# Patient Record
Sex: MALE | Race: WHITE | NOT HISPANIC OR LATINO | URBAN - METROPOLITAN AREA
[De-identification: names, ages, dates, MRNs, and addresses within clinical notes are randomized per-mention and may not be internally consistent; named-entity substitution may affect disease eponyms.]

---
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
## 2022-02-15 NOTE — PROGRESS NOTES
Patient:   NURY AGUILA            MRN: [id number]            FIN: [id number]               Age:   21 years     Sex:  Male     :  2000   Associated Diagnoses:   Close exposure to 2019-nCoV; Cough   Author:   Buddy Ramirez PA-C      Visit Information      Date of Service: 10/25/2021 11:38 am  Performing Location: [facility]  Encounter#: [id number]      Primary Care Provider (PCP):  TAM BELTRAN      Referring Provider:  Buddy Ramirez PA-C    NPI# [id number]   Visit type:  Video Visit via docplanner or [employer].    Participants in room during visit:  2   Location of patient:  Home  Location of provider:  _ (Clinic office )  Video Start Time:  1207  Video End Time:   1215    Today's visit was conducted via video conference due to the COVID-19 pandemic.  The patient's consent to proceed with a video visit has been obtained and documented. He has been vaccinated for Covid. No known Covid exposure. Would like to be tested. States no chronic illnesses.       Chief Complaint   URI symptoms      History of Present Illness   Patient is a 21 year old  who is being evaluated via a billable video visit.      Review of Systems   Constitutional:  Fever, Chills.    Eye:  Negative.    Ear/Nose/Mouth/Throat:  Nasal congestion, Sore throat.    Respiratory:  Cough.    Cardiovascular:  Negative.    Gastrointestinal:  Negative.    Genitourinary:  Negative.    Immunologic:  Negative.    Musculoskeletal:  Negative.    Integumentary:  Negative.    Neurologic:  Headache.    Psychiatric:  Negative.       Health Status   Allergies:    Allergic Reactions (Selected)  Severity Not Documented  No known allergies (No reactions were documented)  No Known Medication Allergies   Medications:  (Selected)      Problem list:    No problem items selected or recorded.      Histories   Past Medical History:    No active or resolved past medical history items have been selected or recorded.   Family History:    No  family history items have been selected or recorded.   Procedure history:    No active procedure history items have been selected or recorded.   Social History:        Electronic Cigarette/Vaping Assessment            Electronic Cigarette Use: Never.      Tobacco Assessment            Never (less than 100 in lifetime)        Physical Examination   General:  Alert and oriented, No acute distress.    Eye:  Pupils are equal, round and reactive to light, Normal conjunctiva.    HENT:  Oral mucosa is moist.    Neck:  Supple.    Respiratory:  Respirations are non-labored.    Psychiatric:  Cooperative, Appropriate mood & affect, Normal judgment.       Impression and Plan   Diagnosis     Close exposure to 2019-nCoV ([id number]-ZZ Z20.822).     Cough ([id number]-ZH R05.9).     Plan:  Patient should remain isolated until results of test return and given that tests are not 100% accurate, would be safest to assume that they are contagious with COVID-19 until their symptoms have fully resolved. Isolation is recommended for at least 7 days from the onset of symptoms and for 3 days after resolution of fevers and productive cough. This means patient should not go to work or any public areas. In addition, it is recommended at home that they separate themselves from other people and from animals as much as possible, including using a separate bathroom. If they do need to be around others, a facemask is recommended. Frequent hand hygiene and cleaning of high touch surfaces is also recommended.   Symptoms can last for several weeks. For patients with COVID-19, they can sometimes start to improve and then get worse again. If symptoms worsen at any time, including significant shortness of breath, low oxygen levels, high fevers that cannot be controlled, or concerns for dehydration, they should seek medical care. If going to the ER, calling 911, or seeking care at the clinic, they are reminded to notify staff that they have been tested for  COVID-19.  Patient also is informed that testing will be done in their car at a scheduled time. Test will be sent to an outside commercial lab and billed by that lab. [facility] cannot confirm to patient how billing will be handled by their insurance company.    Patient is also informed that testing for COVID-19 must be reported to the public health department along with contact information for the patient.  .    Patient Instructions:       Counseled: Patient, Activity, Verbalized understanding.    Orders     Orders (Selected)   Outpatient Orders  Ordered  SARS-CoV-2 RNA (COVID-19), Qualitative NAAT (Request): Close exposure to 2019-nCoV  Cough.        Health Maintenance      Recommendations     Pending (in the next year)        OverDue           Body Mass Index Check due  12/03/14  and every 1  year(s)           High Blood Pressure Screen due  12/03/14  and every 1  year(s)           Influenza Vaccine due  09/01/21  and every 1  year(s)        Due            Alcohol Misuse Screen due  10/25/21  and every 1  year(s)           Depression Screen due  10/25/21  and every 1  year(s)           HIV Screen (if sexually active) due  10/25/21  and every 1  year(s)           STD Counseling (if sexually active) due  10/25/21  and every 1  year(s)           Syphilis Screen (if sexually active) due  10/25/21  and every 1  year(s)     Satisfied (in the past 1 year)        Satisfied            Tobacco Use Screen on  10/25/21.

## 2022-02-15 NOTE — TELEPHONE ENCOUNTER
---------------------  From: Anuja Cises   Sent: 10/27/2021 11:33:51 AM CDT  Subject: negative COVID results      Tried to call pt to notify him of negative COVID results, but no answer and VMbox not set up.Pt called back and was informed of negative results.

## 2022-02-15 NOTE — NURSING NOTE
Comprehensive Intake Entered On:  10/25/2021 12:04 PM CDT    Performed On:  10/25/2021 12:01 PM CDT by Tami Londono CMA               Summary   Chief Complaint :   c/o sore throat x 1 week, chills, body aches, HA,fever ;verbal consent given for video visit   Tami Londono CMA - 10/25/2021 12:01 PM CDT   Health Status   Tobacco Use? :   Never smoker   Tami Londono CMA - 10/25/2021 12:01 PM CDT   Meds / Allergies   (As Of: 10/25/2021 12:04:36 PM CDT)   Allergies (Active)   No known allergies  Estimated Onset Date:   Unspecified ; Created By:   Generated Domain User for [id number]; Reaction Status:   Active ; Substance:   No known allergies ; Updated By:   Generated Domain User for [id number]; Reviewed Date:   12/3/2013 12:00 AM CST      No Known Medication Allergies  Estimated Onset Date:   Unspecified ; Created By:   Tami Londono CMA; Reaction Status:   Active ; Category:   Drug ; Substance:   No Known Medication Allergies ; Type:   Allergy ; Updated By:   Tami Londono CMA; Reviewed Date:   10/25/2021 12:02 PM CDT        Medication List   (As Of: 10/25/2021 12:04:36 PM CDT)   Home Meds    dexamethasone  :   dexamethasone ; Status:   Processing ; Ordered As Mnemonic:   DECADRON 6 MG ORAL TABLET ([id number]) ; Action Display:   Complete ; Catalog Code:   dexamethasone ; Order Dt/Tm:   10/25/2021 12:02:13 PM CDT          albuterol  :   albuterol ; Status:   Processing ; Ordered As Mnemonic:   ALBUTEROL 2.5 MG/3 ML (0.083%) INHALATION SOLUTION ([id number]) ; Action Display:   Complete ; Catalog Code:   albuterol ; Order Dt/Tm:   10/25/2021 12:02:14 PM CDT          Misc Prescription  :   Misc Prescription ; Status:   Processing ; Ordered As Mnemonic:   MISC RX SUPPLY (Misc Prescription) ; Action Display:   Complete ; Catalog Code:   Miscellaneous Prescription ; Order Dt/Tm:   10/25/2021 12:02:14 PM CDT            Social History   Social History   (As Of: 10/25/2021 12:04:36 PM CDT)   Tobacco:        Never (less than 100 in lifetime)    (Last Updated: 10/25/2021 12:02:22 PM CDT by Tami Londono CMA)          Electronic Cigarette/Vaping:        Electronic Cigarette Use: Never.   (Last Updated: 10/25/2021 12:02:26 PM CDT by Tami Londono CMA)